# Patient Record
Sex: FEMALE | Race: WHITE | Employment: UNEMPLOYED | URBAN - METROPOLITAN AREA
[De-identification: names, ages, dates, MRNs, and addresses within clinical notes are randomized per-mention and may not be internally consistent; named-entity substitution may affect disease eponyms.]

---

## 2022-06-10 ENCOUNTER — HOSPITAL ENCOUNTER (OUTPATIENT)
Dept: MAMMOGRAPHY | Age: 67
Discharge: HOME | End: 2022-06-12
Payer: COMMERCIAL

## 2022-06-10 VITALS — BODY MASS INDEX: 30.53 KG/M2 | WEIGHT: 190 LBS | HEIGHT: 66 IN

## 2022-06-10 DIAGNOSIS — Z12.31 ENCOUNTER FOR SCREENING MAMMOGRAM FOR MALIGNANT NEOPLASM OF BREAST: ICD-10-CM

## 2022-06-10 PROCEDURE — 77063 BREAST TOMOSYNTHESIS BI: CPT

## 2024-01-29 ENCOUNTER — HOSPITAL ENCOUNTER (EMERGENCY)
Age: 69
Discharge: HOME | End: 2024-01-29
Payer: MEDICARE

## 2024-01-29 ENCOUNTER — APPOINTMENT (OUTPATIENT)
Dept: CT IMAGING | Age: 69
End: 2024-01-29
Payer: MEDICARE

## 2024-01-29 ENCOUNTER — APPOINTMENT (OUTPATIENT)
Dept: GENERAL RADIOLOGY | Age: 69
End: 2024-01-29
Payer: MEDICARE

## 2024-01-29 VITALS
TEMPERATURE: 97.1 F | SYSTOLIC BLOOD PRESSURE: 157 MMHG | HEART RATE: 73 BPM | OXYGEN SATURATION: 96 % | DIASTOLIC BLOOD PRESSURE: 94 MMHG | RESPIRATION RATE: 24 BRPM

## 2024-01-29 DIAGNOSIS — S42.212A CLOSED FRACTURE OF NECK OF LEFT HUMERUS, INITIAL ENCOUNTER: Primary | ICD-10-CM

## 2024-01-29 PROCEDURE — 73200 CT UPPER EXTREMITY W/O DYE: CPT

## 2024-01-29 PROCEDURE — 6370000000 HC RX 637 (ALT 250 FOR IP): Performed by: PHYSICIAN ASSISTANT

## 2024-01-29 PROCEDURE — 73060 X-RAY EXAM OF HUMERUS: CPT

## 2024-01-29 PROCEDURE — 73030 X-RAY EXAM OF SHOULDER: CPT

## 2024-01-29 PROCEDURE — 99284 EMERGENCY DEPT VISIT MOD MDM: CPT

## 2024-01-29 RX ORDER — OXYCODONE HYDROCHLORIDE AND ACETAMINOPHEN 5; 325 MG/1; MG/1
1 TABLET ORAL ONCE
Status: COMPLETED | OUTPATIENT
Start: 2024-01-29 | End: 2024-01-29

## 2024-01-29 RX ORDER — ONDANSETRON 4 MG/1
4 TABLET, ORALLY DISINTEGRATING ORAL ONCE
Status: COMPLETED | OUTPATIENT
Start: 2024-01-29 | End: 2024-01-29

## 2024-01-29 RX ORDER — OXYCODONE HYDROCHLORIDE AND ACETAMINOPHEN 5; 325 MG/1; MG/1
1 TABLET ORAL EVERY 6 HOURS PRN
Qty: 12 TABLET | Refills: 0 | Status: SHIPPED | OUTPATIENT
Start: 2024-01-29 | End: 2024-02-01

## 2024-01-29 RX ORDER — ONDANSETRON 4 MG/1
4 TABLET, ORALLY DISINTEGRATING ORAL 3 TIMES DAILY PRN
Qty: 21 TABLET | Refills: 0 | Status: SHIPPED | OUTPATIENT
Start: 2024-01-29

## 2024-01-29 RX ADMIN — OXYCODONE AND ACETAMINOPHEN 1 TABLET: 5; 325 TABLET ORAL at 22:42

## 2024-01-29 RX ADMIN — ONDANSETRON 4 MG: 4 TABLET, ORALLY DISINTEGRATING ORAL at 22:41

## 2024-01-29 RX ADMIN — OXYCODONE AND ACETAMINOPHEN 1 TABLET: 5; 325 TABLET ORAL at 21:46

## 2024-01-29 ASSESSMENT — PAIN SCALES - GENERAL
PAINLEVEL_OUTOF10: 9
PAINLEVEL_OUTOF10: 9
PAINLEVEL_OUTOF10: 5

## 2024-01-29 ASSESSMENT — LIFESTYLE VARIABLES
HOW MANY STANDARD DRINKS CONTAINING ALCOHOL DO YOU HAVE ON A TYPICAL DAY: 1 OR 2
HOW OFTEN DO YOU HAVE A DRINK CONTAINING ALCOHOL: MONTHLY OR LESS

## 2024-01-29 ASSESSMENT — PAIN - FUNCTIONAL ASSESSMENT: PAIN_FUNCTIONAL_ASSESSMENT: 0-10

## 2024-01-29 ASSESSMENT — PAIN DESCRIPTION - LOCATION: LOCATION: ARM

## 2024-01-29 ASSESSMENT — PAIN DESCRIPTION - ORIENTATION: ORIENTATION: LEFT

## 2024-01-30 NOTE — ED NOTES
Pt given d/c instructions by primary RN. Pt able to verbalize understanding and was ambulatory out of department.

## 2024-01-30 NOTE — ED PROVIDER NOTES
This is a 68-year-old female who presents to the ER after a mechanical fall just prior to arrival, landing on her left shoulder.  9 out of 10 left shoulder pain with decreased range of motion.  Vital signs are stable. PE reveals: + Tenderness palpation at the left shoulder and bicipital groove.  Patient will not flex or extend her shoulder at all because of the pain.  Full range of motion of the elbow, wrist and digits.  No obvious deformity, ecchymosis or edema    Patient given 2 Tabs Percocet 5-3 25 p.o.    XR: Displaced fractures of the humeral neck and greater tuberosity    Ortho consult placed: Spoke with the ortho resident. Recommends CT scan, likely surgical but can be followed up outpatient in the office with Dr. Singletary. Apply shoulder sling.    CT scan performed.    Patient received shoulder immobilizer applied by RN.  Patient discharged home with Percocet for 3 days and Zofran as needed for nausea.  Follow-up with Dr. Singletary and PCP.  She is notified of red flag symptoms and when to return to the ER.  She agreed to treatment plan and had no further questions.  On reevaluation she remained hemodynamically stable, neurovascularly intact and in no acute distress.       Counseling:   The emergency provider has spoken with the patient and discussed today’s results, in addition to providing specific details for the plan of care and counseling regarding the diagnosis and prognosis.  Questions are answered at this time and they are agreeable with the plan.      --------------------------------- IMPRESSION AND DISPOSITION ---------------------------------    IMPRESSION  1. Closed fracture of neck of left humerus, initial encounter        DISPOSITION  Disposition: Discharge to home  Patient condition is good                 Jane Owens PA-C  01/29/24 1218

## 2024-01-30 NOTE — ED NOTES
Previous RNs were not able to get sling on pt. This RN asked BRICE Owens if using a Large sling would be okay and this RN was able to successfully get a sling on the pt

## 2024-01-31 ENCOUNTER — OFFICE VISIT (OUTPATIENT)
Dept: ORTHOPEDIC SURGERY | Age: 69
End: 2024-01-31
Payer: MEDICARE

## 2024-01-31 VITALS — BODY MASS INDEX: 30.53 KG/M2 | WEIGHT: 190 LBS | TEMPERATURE: 98 F | HEIGHT: 66 IN

## 2024-01-31 DIAGNOSIS — S42.212A FX HUMERAL NECK, LEFT, CLOSED, INITIAL ENCOUNTER: Primary | ICD-10-CM

## 2024-01-31 PROCEDURE — 1036F TOBACCO NON-USER: CPT | Performed by: ORTHOPAEDIC SURGERY

## 2024-01-31 PROCEDURE — 3017F COLORECTAL CA SCREEN DOC REV: CPT | Performed by: ORTHOPAEDIC SURGERY

## 2024-01-31 PROCEDURE — G8484 FLU IMMUNIZE NO ADMIN: HCPCS | Performed by: ORTHOPAEDIC SURGERY

## 2024-01-31 PROCEDURE — G8427 DOCREV CUR MEDS BY ELIG CLIN: HCPCS | Performed by: ORTHOPAEDIC SURGERY

## 2024-01-31 PROCEDURE — 99204 OFFICE O/P NEW MOD 45 MIN: CPT | Performed by: ORTHOPAEDIC SURGERY

## 2024-01-31 PROCEDURE — 1123F ACP DISCUSS/DSCN MKR DOCD: CPT | Performed by: ORTHOPAEDIC SURGERY

## 2024-01-31 PROCEDURE — G8419 CALC BMI OUT NRM PARAM NOF/U: HCPCS | Performed by: ORTHOPAEDIC SURGERY

## 2024-01-31 PROCEDURE — 1090F PRES/ABSN URINE INCON ASSESS: CPT | Performed by: ORTHOPAEDIC SURGERY

## 2024-01-31 PROCEDURE — G8400 PT W/DXA NO RESULTS DOC: HCPCS | Performed by: ORTHOPAEDIC SURGERY

## 2024-01-31 NOTE — PROGRESS NOTES
Cathi Kirk is a 68 y.o. female, who presents   Chief Complaint   Patient presents with    Shoulder Pain     Left Shoulder DOI 01/29/2023, had a fall while bowling.  Was seen at [facility] and is currently in a sling.         HPI:: Injury occurred 2 days ago and a fall.  Mrs. Kirk caught her left foot and fell forward and hurt her left shoulder.  It was very painful injury.  She was evaluated in the emergency room including x-rays and CT of the shoulder.  She is placed in a sling and was told that she probably needs surgery.  Her  was present with her throughout the visit and we discussed the injury and treatment options.    Allergies; medications; past medical, surgical, family, and social history; and problem list have been reviewed today and updated as indicated in this encounter - see below following Ortho specifics.    Musculoskeletal: Skin condition gross neurovascular functions good in the left upper extremity.  The arm is in a sling.  Disposition appropriately for to support the arm.  There is no ecchymosis in the shoulder yet.  Told Mrs. Kirk to expect that over time.  The shoulder is quite uncomfortable Forren was not moved around accordingly.    Radiologic Studies: Imaging shows a fracture of the humeral neck which appears to be roughly transverse in the surgical neck area.  There is also a vertical component  the greater tuberosity with slight elevation.  No other bony injuries are noted.    ASSESSMENT:  Cathi was seen today for shoulder pain.    Diagnoses and all orders for this visit:    Fx humeral neck, left, closed, initial encounter     Treatment alternatives were reviewed including medical and physical therapies, injections, and surgical options, expected risks benefits and likely outcome of each were discussed in detail, questions asked and answered and understood.   It seems their understanding of the injury is based primarily on ER personnel's statements   I discussed

## 2024-02-02 ENCOUNTER — TELEPHONE (OUTPATIENT)
Dept: ORTHOPEDIC SURGERY | Age: 69
End: 2024-02-02

## 2024-02-02 ENCOUNTER — PREP FOR PROCEDURE (OUTPATIENT)
Dept: ORTHOPEDIC SURGERY | Age: 69
End: 2024-02-02

## 2024-02-02 PROBLEM — S42.212A CLOSED DISPLACED FRACTURE OF SURGICAL NECK OF LEFT HUMERUS: Status: ACTIVE | Noted: 2024-02-02

## 2024-02-02 NOTE — TELEPHONE ENCOUNTER
Prior Authorization Form:      DEMOGRAPHICS:                     Patient Name:  Cathi Azevedo  Patient :  1955            Insurance:  Payor: MEDICARE / Plan: MEDICARE PART A AND B / Product Type: *No Product type* /   Insurance ID Number:    Payer/Plan Subscr  Sex Relation Sub. Ins. ID Effective Group Num   1. MEDICARE - [place]* CATHI AZEVEDO 1955 Female Self [id number] 24                                    [address] [address]   2. [facility]* CATHI AZEVEDO 1955 Female Self [id number] 24                                    [id number]         DIAGNOSIS & PROCEDURE:                       Procedure/Operation: ORIF - LEFT PROXIMAL HUMERUS           CPT Code: 11963    Diagnosis:  FRACTURE HUMERAL LEFT NECK    ICD10 Code: S42.212A    Location:  [facility]    Surgeon:  SAURABH DILL D.O.    SCHEDULING INFORMATION:                          Date: 2024    Time: TBA              Anesthesia:  General                                                       Status:  Outpatient        Special Comments:  NONE       Electronically signed by Agnieszka Biggs on 2024 at 8:21 AM

## 2024-02-04 ENCOUNTER — ANESTHESIA EVENT (OUTPATIENT)
Dept: OPERATING ROOM | Age: 69
End: 2024-02-04
Payer: MEDICARE

## 2024-02-05 ENCOUNTER — HOSPITAL ENCOUNTER (OUTPATIENT)
Dept: PREADMISSION TESTING | Age: 69
Discharge: HOME | End: 2024-02-05
Payer: MEDICARE

## 2024-02-05 VITALS
DIASTOLIC BLOOD PRESSURE: 80 MMHG | BODY MASS INDEX: 29.73 KG/M2 | SYSTOLIC BLOOD PRESSURE: 145 MMHG | HEIGHT: 66 IN | HEART RATE: 60 BPM | RESPIRATION RATE: 16 BRPM | WEIGHT: 185 LBS | TEMPERATURE: 97 F | OXYGEN SATURATION: 100 %

## 2024-02-05 DIAGNOSIS — Z01.818 PRE-OP TESTING: Primary | ICD-10-CM

## 2024-02-05 LAB
ANION GAP SERPL CALCULATED.3IONS-SCNC: 9 MMOL/L (ref 7–16)
BASOPHILS # BLD: 0.02 K/UL (ref 0–0.2)
BASOPHILS NFR BLD: 0 % (ref 0–2)
BUN SERPL-MCNC: 13 MG/DL (ref 6–23)
CALCIUM SERPL-MCNC: 9 MG/DL (ref 8.6–10.2)
CHLORIDE SERPL-SCNC: 102 MMOL/L (ref 98–107)
CO2 SERPL-SCNC: 28 MMOL/L (ref 22–29)
CREAT SERPL-MCNC: 0.9 MG/DL (ref 0.5–1)
EOSINOPHIL # BLD: 0.09 K/UL (ref 0.05–0.5)
EOSINOPHILS RELATIVE PERCENT: 1 % (ref 0–6)
ERYTHROCYTE [DISTWIDTH] IN BLOOD BY AUTOMATED COUNT: 13.2 % (ref 11.5–15)
GFR SERPL CREATININE-BSD FRML MDRD: >60 ML/MIN/1.73M2
GLUCOSE SERPL-MCNC: 109 MG/DL (ref 74–99)
HCT VFR BLD AUTO: 38.6 % (ref 34–48)
HGB BLD-MCNC: 13.1 G/DL (ref 11.5–15.5)
IMM GRANULOCYTES # BLD AUTO: <0.03 K/UL (ref 0–0.58)
IMM GRANULOCYTES NFR BLD: 0 % (ref 0–5)
LYMPHOCYTES NFR BLD: 1.66 K/UL (ref 1.5–4)
LYMPHOCYTES RELATIVE PERCENT: 20 % (ref 20–42)
MCH RBC QN AUTO: 30.5 PG (ref 26–35)
MCHC RBC AUTO-ENTMCNC: 33.9 G/DL (ref 32–34.5)
MCV RBC AUTO: 90 FL (ref 80–99.9)
MONOCYTES NFR BLD: 0.52 K/UL (ref 0.1–0.95)
MONOCYTES NFR BLD: 6 % (ref 2–12)
NEUTROPHILS NFR BLD: 72 % (ref 43–80)
NEUTS SEG NFR BLD: 5.9 K/UL (ref 1.8–7.3)
PLATELET # BLD AUTO: 272 K/UL (ref 130–450)
PMV BLD AUTO: 9.5 FL (ref 7–12)
POTASSIUM SERPL-SCNC: 4.1 MMOL/L (ref 3.5–5)
RBC # BLD AUTO: 4.29 M/UL (ref 3.5–5.5)
SODIUM SERPL-SCNC: 139 MMOL/L (ref 132–146)
WBC OTHER # BLD: 8.2 K/UL (ref 4.5–11.5)

## 2024-02-05 PROCEDURE — 80048 BASIC METABOLIC PNL TOTAL CA: CPT

## 2024-02-05 PROCEDURE — 93005 ELECTROCARDIOGRAM TRACING: CPT | Performed by: ANESTHESIOLOGY

## 2024-02-05 PROCEDURE — 85025 COMPLETE CBC W/AUTO DIFF WBC: CPT

## 2024-02-05 ASSESSMENT — PAIN DESCRIPTION - DESCRIPTORS: DESCRIPTORS: ACHING

## 2024-02-05 ASSESSMENT — PAIN SCALES - GENERAL: PAINLEVEL_OUTOF10: 3

## 2024-02-05 ASSESSMENT — PAIN DESCRIPTION - ORIENTATION: ORIENTATION: LEFT

## 2024-02-05 ASSESSMENT — PAIN DESCRIPTION - PAIN TYPE: TYPE: ACUTE PAIN

## 2024-02-05 ASSESSMENT — PAIN DESCRIPTION - LOCATION: LOCATION: ARM

## 2024-02-05 NOTE — PROGRESS NOTES
[facility]                                                                                                                    PRE OP INSTRUCTIONS FOR  Cathi Kirk        Date: 2/5/2024    Date of surgery: 2/6/24       Arrival Time: Hospital will call you between 5pm and 7pm with your final arrival time for surgery    Nothing by mouth (NPO) as instructed.______after midnight_____    Take the following medications with a small sip of water on the morning of Surgery: propranolol, meclizine if needed     Diabetics may take evening dose of insulin but none after midnight.  If you feel symptomatic or low blood sugar morning of surgery drink 1-2 ounces of apple juice only.    Aspirin, Ibuprofen, Advil, Naproxen, Vitamin E and other Anti-inflammatory products should be stopped  before surgery  as directed by your physician.  Take Tylenol only unless instructed otherwise by your surgeon.    Check with your Doctor regarding stopping Plavix, Coumadin, Lovenox, Eliquis, Effient, or other blood thinners.    Do not smoke,use illicit drugs and do not drink any alcoholic beverages 24 hours prior to surgery.    You may brush your teeth the morning of surgery.  DO NOT SWALLOW WATER    You MUST make arrangements for a responsible adult to take you home after your surgery. You will not be allowed to leave alone or drive yourself home.  It is strongly suggested someone stay with you the first 24 hrs. Your surgery will be cancelled if you do not have a ride home.    PEDIATRIC PATIENTS ONLY:  A parent/legal guardian must accompany a child scheduled for surgery and plan to stay at the hospital until the child is discharged.  Please do not bring other children with you.    Please wear simple, loose fitting clothing to the hospital.  Do not bring valuables (money, credit cards, checkbooks, etc.) Do not wear any makeup (including no eye makeup) or nail polish on your fingers or toes.    DO NOT wear any jewelry or

## 2024-02-06 ENCOUNTER — ANESTHESIA (OUTPATIENT)
Dept: OPERATING ROOM | Age: 69
End: 2024-02-06
Payer: MEDICARE

## 2024-02-06 ENCOUNTER — HOSPITAL ENCOUNTER (OUTPATIENT)
Age: 69
Setting detail: OUTPATIENT SURGERY
Discharge: HOME | End: 2024-02-06
Attending: ORTHOPAEDIC SURGERY | Admitting: ORTHOPAEDIC SURGERY
Payer: MEDICARE

## 2024-02-06 ENCOUNTER — HOSPITAL ENCOUNTER (OUTPATIENT)
Dept: GENERAL RADIOLOGY | Age: 69
Discharge: HOME | End: 2024-02-08
Attending: ORTHOPAEDIC SURGERY
Payer: MEDICARE

## 2024-02-06 VITALS
SYSTOLIC BLOOD PRESSURE: 128 MMHG | OXYGEN SATURATION: 94 % | DIASTOLIC BLOOD PRESSURE: 68 MMHG | RESPIRATION RATE: 16 BRPM | TEMPERATURE: 97 F | HEART RATE: 60 BPM

## 2024-02-06 DIAGNOSIS — S42.212A CLOSED DISPLACED FRACTURE OF SURGICAL NECK OF LEFT HUMERUS, UNSPECIFIED FRACTURE MORPHOLOGY, INITIAL ENCOUNTER: ICD-10-CM

## 2024-02-06 DIAGNOSIS — S42.212A CLOSED DISPLACED FRACTURE OF SURGICAL NECK OF LEFT HUMERUS, UNSPECIFIED FRACTURE MORPHOLOGY, INITIAL ENCOUNTER: Primary | ICD-10-CM

## 2024-02-06 PROCEDURE — 7100000010 HC PHASE II RECOVERY - FIRST 15 MIN: Performed by: ORTHOPAEDIC SURGERY

## 2024-02-06 PROCEDURE — 3600000004 HC SURGERY LEVEL 4 BASE: Performed by: ORTHOPAEDIC SURGERY

## 2024-02-06 PROCEDURE — 6370000000 HC RX 637 (ALT 250 FOR IP)

## 2024-02-06 PROCEDURE — C1713 ANCHOR/SCREW BN/BN,TIS/BN: HCPCS | Performed by: ORTHOPAEDIC SURGERY

## 2024-02-06 PROCEDURE — 6360000002 HC RX W HCPCS

## 2024-02-06 PROCEDURE — 7100000001 HC PACU RECOVERY - ADDTL 15 MIN: Performed by: ORTHOPAEDIC SURGERY

## 2024-02-06 PROCEDURE — 2580000003 HC RX 258: Performed by: NURSE ANESTHETIST, CERTIFIED REGISTERED

## 2024-02-06 PROCEDURE — 3600000014 HC SURGERY LEVEL 4 ADDTL 15MIN: Performed by: ORTHOPAEDIC SURGERY

## 2024-02-06 PROCEDURE — 3700000000 HC ANESTHESIA ATTENDED CARE: Performed by: ORTHOPAEDIC SURGERY

## 2024-02-06 PROCEDURE — 7100000011 HC PHASE II RECOVERY - ADDTL 15 MIN: Performed by: ORTHOPAEDIC SURGERY

## 2024-02-06 PROCEDURE — 6360000002 HC RX W HCPCS: Performed by: ORTHOPAEDIC SURGERY

## 2024-02-06 PROCEDURE — 2709999900 HC NON-CHARGEABLE SUPPLY: Performed by: ORTHOPAEDIC SURGERY

## 2024-02-06 PROCEDURE — 2580000003 HC RX 258: Performed by: ORTHOPAEDIC SURGERY

## 2024-02-06 PROCEDURE — 23615 OPTX PROX HUMRL FX W/INT FIX: CPT | Performed by: ORTHOPAEDIC SURGERY

## 2024-02-06 PROCEDURE — 2720000010 HC SURG SUPPLY STERILE: Performed by: ORTHOPAEDIC SURGERY

## 2024-02-06 PROCEDURE — 6360000002 HC RX W HCPCS: Performed by: NURSE ANESTHETIST, CERTIFIED REGISTERED

## 2024-02-06 PROCEDURE — 7100000000 HC PACU RECOVERY - FIRST 15 MIN: Performed by: ORTHOPAEDIC SURGERY

## 2024-02-06 PROCEDURE — 6360000002 HC RX W HCPCS: Performed by: ANESTHESIOLOGY

## 2024-02-06 PROCEDURE — 2580000003 HC RX 258: Performed by: ANESTHESIOLOGY

## 2024-02-06 PROCEDURE — 3700000001 HC ADD 15 MINUTES (ANESTHESIA): Performed by: ORTHOPAEDIC SURGERY

## 2024-02-06 PROCEDURE — 64415 NJX AA&/STRD BRCH PLXS IMG: CPT | Performed by: ANESTHESIOLOGY

## 2024-02-06 DEVICE — IMPLANTABLE DEVICE: Type: IMPLANTABLE DEVICE | Site: HUMERUS | Status: FUNCTIONAL

## 2024-02-06 RX ORDER — MIDAZOLAM HYDROCHLORIDE 1 MG/ML
2 INJECTION INTRAMUSCULAR; INTRAVENOUS ONCE
Status: DISCONTINUED | OUTPATIENT
Start: 2024-02-06 | End: 2024-02-06 | Stop reason: HOSPADM

## 2024-02-06 RX ORDER — ONDANSETRON 2 MG/ML
4 INJECTION INTRAMUSCULAR; INTRAVENOUS
Status: DISCONTINUED | OUTPATIENT
Start: 2024-02-06 | End: 2024-02-06 | Stop reason: HOSPADM

## 2024-02-06 RX ORDER — LIDOCAINE HYDROCHLORIDE 20 MG/ML
INJECTION, SOLUTION INTRAVENOUS PRN
Status: DISCONTINUED | OUTPATIENT
Start: 2024-02-06 | End: 2024-02-06 | Stop reason: SDUPTHER

## 2024-02-06 RX ORDER — FENTANYL CITRATE 0.05 MG/ML
25 INJECTION, SOLUTION INTRAMUSCULAR; INTRAVENOUS EVERY 5 MIN PRN
Status: DISCONTINUED | OUTPATIENT
Start: 2024-02-06 | End: 2024-02-06 | Stop reason: HOSPADM

## 2024-02-06 RX ORDER — FENTANYL CITRATE 50 UG/ML
INJECTION, SOLUTION INTRAMUSCULAR; INTRAVENOUS
Status: DISCONTINUED
Start: 2024-02-06 | End: 2024-02-06 | Stop reason: HOSPADM

## 2024-02-06 RX ORDER — SODIUM CHLORIDE, SODIUM LACTATE, POTASSIUM CHLORIDE, CALCIUM CHLORIDE 600; 310; 30; 20 MG/100ML; MG/100ML; MG/100ML; MG/100ML
INJECTION, SOLUTION INTRAVENOUS CONTINUOUS PRN
Status: DISCONTINUED | OUTPATIENT
Start: 2024-02-06 | End: 2024-02-06 | Stop reason: SDUPTHER

## 2024-02-06 RX ORDER — MIDAZOLAM HYDROCHLORIDE 1 MG/ML
2 INJECTION INTRAMUSCULAR; INTRAVENOUS ONCE
Status: COMPLETED | OUTPATIENT
Start: 2024-02-06 | End: 2024-02-06

## 2024-02-06 RX ORDER — PROPOFOL 10 MG/ML
INJECTION, EMULSION INTRAVENOUS PRN
Status: DISCONTINUED | OUTPATIENT
Start: 2024-02-06 | End: 2024-02-06 | Stop reason: SDUPTHER

## 2024-02-06 RX ORDER — ROPIVACAINE HYDROCHLORIDE 5 MG/ML
INJECTION, SOLUTION EPIDURAL; INFILTRATION; PERINEURAL
Status: COMPLETED | OUTPATIENT
Start: 2024-02-06 | End: 2024-02-06

## 2024-02-06 RX ORDER — MIDAZOLAM HYDROCHLORIDE 1 MG/ML
INJECTION INTRAMUSCULAR; INTRAVENOUS
Status: COMPLETED
Start: 2024-02-06 | End: 2024-02-06

## 2024-02-06 RX ORDER — VANCOMYCIN HYDROCHLORIDE 1 G/20ML
INJECTION, POWDER, LYOPHILIZED, FOR SOLUTION INTRAVENOUS PRN
Status: DISCONTINUED | OUTPATIENT
Start: 2024-02-06 | End: 2024-02-06 | Stop reason: ALTCHOICE

## 2024-02-06 RX ORDER — DEXAMETHASONE SODIUM PHOSPHATE 10 MG/ML
INJECTION, SOLUTION INTRAMUSCULAR; INTRAVENOUS PRN
Status: DISCONTINUED | OUTPATIENT
Start: 2024-02-06 | End: 2024-02-06 | Stop reason: SDUPTHER

## 2024-02-06 RX ORDER — GLYCOPYRROLATE 0.2 MG/ML
INJECTION INTRAMUSCULAR; INTRAVENOUS PRN
Status: DISCONTINUED | OUTPATIENT
Start: 2024-02-06 | End: 2024-02-06 | Stop reason: SDUPTHER

## 2024-02-06 RX ORDER — FENTANYL CITRATE 50 UG/ML
100 INJECTION, SOLUTION INTRAMUSCULAR; INTRAVENOUS ONCE
Status: COMPLETED | OUTPATIENT
Start: 2024-02-06 | End: 2024-02-06

## 2024-02-06 RX ORDER — NEOSTIGMINE METHYLSULFATE 1 MG/ML
INJECTION, SOLUTION INTRAVENOUS PRN
Status: DISCONTINUED | OUTPATIENT
Start: 2024-02-06 | End: 2024-02-06 | Stop reason: SDUPTHER

## 2024-02-06 RX ORDER — SODIUM CHLORIDE, SODIUM LACTATE, POTASSIUM CHLORIDE, CALCIUM CHLORIDE 600; 310; 30; 20 MG/100ML; MG/100ML; MG/100ML; MG/100ML
INJECTION, SOLUTION INTRAVENOUS CONTINUOUS
Status: DISCONTINUED | OUTPATIENT
Start: 2024-02-06 | End: 2024-02-06 | Stop reason: HOSPADM

## 2024-02-06 RX ORDER — FENTANYL CITRATE 50 UG/ML
100 INJECTION, SOLUTION INTRAMUSCULAR; INTRAVENOUS ONCE
Status: DISCONTINUED | OUTPATIENT
Start: 2024-02-06 | End: 2024-02-06 | Stop reason: HOSPADM

## 2024-02-06 RX ORDER — SODIUM CHLORIDE 0.9 % (FLUSH) 0.9 %
5-40 SYRINGE (ML) INJECTION EVERY 12 HOURS SCHEDULED
Status: DISCONTINUED | OUTPATIENT
Start: 2024-02-06 | End: 2024-02-06 | Stop reason: HOSPADM

## 2024-02-06 RX ORDER — OXYCODONE HYDROCHLORIDE AND ACETAMINOPHEN 5; 325 MG/1; MG/1
1 TABLET ORAL EVERY 6 HOURS PRN
Qty: 28 TABLET | Refills: 0 | Status: SHIPPED | OUTPATIENT
Start: 2024-02-06 | End: 2024-02-21

## 2024-02-06 RX ORDER — FENTANYL CITRATE 50 UG/ML
INJECTION, SOLUTION INTRAMUSCULAR; INTRAVENOUS PRN
Status: DISCONTINUED | OUTPATIENT
Start: 2024-02-06 | End: 2024-02-06 | Stop reason: SDUPTHER

## 2024-02-06 RX ORDER — FENTANYL CITRATE 50 UG/ML
INJECTION, SOLUTION INTRAMUSCULAR; INTRAVENOUS
Status: DISCONTINUED
Start: 2024-02-06 | End: 2024-02-06 | Stop reason: WASHOUT

## 2024-02-06 RX ORDER — SCOLOPAMINE TRANSDERMAL SYSTEM 1 MG/1
PATCH, EXTENDED RELEASE TRANSDERMAL
Status: COMPLETED
Start: 2024-02-06 | End: 2024-02-06

## 2024-02-06 RX ORDER — DIPHENHYDRAMINE HYDROCHLORIDE 50 MG/ML
INJECTION INTRAMUSCULAR; INTRAVENOUS PRN
Status: DISCONTINUED | OUTPATIENT
Start: 2024-02-06 | End: 2024-02-06 | Stop reason: SDUPTHER

## 2024-02-06 RX ORDER — SODIUM CHLORIDE 9 MG/ML
INJECTION, SOLUTION INTRAVENOUS PRN
Status: DISCONTINUED | OUTPATIENT
Start: 2024-02-06 | End: 2024-02-06 | Stop reason: HOSPADM

## 2024-02-06 RX ORDER — ONDANSETRON 2 MG/ML
INJECTION INTRAMUSCULAR; INTRAVENOUS PRN
Status: DISCONTINUED | OUTPATIENT
Start: 2024-02-06 | End: 2024-02-06 | Stop reason: SDUPTHER

## 2024-02-06 RX ORDER — ROPIVACAINE HYDROCHLORIDE 5 MG/ML
INJECTION, SOLUTION EPIDURAL; INFILTRATION; PERINEURAL
Status: COMPLETED
Start: 2024-02-06 | End: 2024-02-06

## 2024-02-06 RX ORDER — ROPIVACAINE HYDROCHLORIDE 5 MG/ML
30 INJECTION, SOLUTION EPIDURAL; INFILTRATION; PERINEURAL ONCE
Status: DISCONTINUED | OUTPATIENT
Start: 2024-02-06 | End: 2024-02-06 | Stop reason: HOSPADM

## 2024-02-06 RX ORDER — SCOLOPAMINE TRANSDERMAL SYSTEM 1 MG/1
1 PATCH, EXTENDED RELEASE TRANSDERMAL
Status: DISCONTINUED | OUTPATIENT
Start: 2024-02-06 | End: 2024-02-06 | Stop reason: HOSPADM

## 2024-02-06 RX ORDER — SODIUM CHLORIDE 0.9 % (FLUSH) 0.9 %
5-40 SYRINGE (ML) INJECTION PRN
Status: DISCONTINUED | OUTPATIENT
Start: 2024-02-06 | End: 2024-02-06 | Stop reason: HOSPADM

## 2024-02-06 RX ORDER — MIDAZOLAM HYDROCHLORIDE 1 MG/ML
2 INJECTION INTRAMUSCULAR; INTRAVENOUS
Status: DISCONTINUED | OUTPATIENT
Start: 2024-02-06 | End: 2024-02-06 | Stop reason: HOSPADM

## 2024-02-06 RX ADMIN — PHENYLEPHRINE HYDROCHLORIDE 100 MCG: 10 INJECTION INTRAVENOUS at 13:47

## 2024-02-06 RX ADMIN — LIDOCAINE HYDROCHLORIDE 100 MG: 20 INJECTION, SOLUTION INTRAVENOUS at 11:50

## 2024-02-06 RX ADMIN — PROPOFOL 10 MG: 10 INJECTION, EMULSION INTRAVENOUS at 11:45

## 2024-02-06 RX ADMIN — ONDANSETRON 4 MG: 2 INJECTION INTRAMUSCULAR; INTRAVENOUS at 11:46

## 2024-02-06 RX ADMIN — GLYCOPYRROLATE 0.2 MG: 0.2 INJECTION INTRAMUSCULAR; INTRAVENOUS at 11:56

## 2024-02-06 RX ADMIN — CEFAZOLIN 2000 MG: 2 INJECTION, POWDER, FOR SOLUTION INTRAMUSCULAR; INTRAVENOUS at 12:00

## 2024-02-06 RX ADMIN — MIDAZOLAM 2 MG: 1 INJECTION INTRAMUSCULAR; INTRAVENOUS at 11:04

## 2024-02-06 RX ADMIN — DEXAMETHASONE SODIUM PHOSPHATE 10 MG: 10 INJECTION INTRAMUSCULAR; INTRAVENOUS at 12:06

## 2024-02-06 RX ADMIN — Medication 3 MG: at 13:54

## 2024-02-06 RX ADMIN — DIPHENHYDRAMINE HYDROCHLORIDE 12.5 MG: 50 INJECTION INTRAMUSCULAR; INTRAVENOUS at 11:46

## 2024-02-06 RX ADMIN — FENTANYL CITRATE 100 MCG: 50 INJECTION, SOLUTION INTRAMUSCULAR; INTRAVENOUS at 11:04

## 2024-02-06 RX ADMIN — GLYCOPYRROLATE 0.6 MG: 0.2 INJECTION INTRAMUSCULAR; INTRAVENOUS at 13:54

## 2024-02-06 RX ADMIN — SODIUM CHLORIDE, POTASSIUM CHLORIDE, SODIUM LACTATE AND CALCIUM CHLORIDE: 600; 310; 30; 20 INJECTION, SOLUTION INTRAVENOUS at 09:46

## 2024-02-06 RX ADMIN — ROPIVACAINE HYDROCHLORIDE 20 ML: 5 INJECTION, SOLUTION EPIDURAL; INFILTRATION; PERINEURAL at 11:14

## 2024-02-06 RX ADMIN — FENTANYL CITRATE 100 MCG: 50 INJECTION INTRAMUSCULAR; INTRAVENOUS at 11:04

## 2024-02-06 RX ADMIN — PHENYLEPHRINE HYDROCHLORIDE 100 MCG: 10 INJECTION INTRAVENOUS at 13:34

## 2024-02-06 RX ADMIN — MIDAZOLAM HYDROCHLORIDE 2 MG: 1 INJECTION INTRAMUSCULAR; INTRAVENOUS at 11:04

## 2024-02-06 RX ADMIN — FENTANYL CITRATE 50 MCG: 50 INJECTION, SOLUTION INTRAMUSCULAR; INTRAVENOUS at 11:50

## 2024-02-06 RX ADMIN — PROPOFOL 160 MG: 10 INJECTION, EMULSION INTRAVENOUS at 11:50

## 2024-02-06 RX ADMIN — FENTANYL CITRATE 50 MCG: 50 INJECTION, SOLUTION INTRAMUSCULAR; INTRAVENOUS at 12:14

## 2024-02-06 RX ADMIN — SODIUM CHLORIDE, POTASSIUM CHLORIDE, SODIUM LACTATE AND CALCIUM CHLORIDE: 600; 310; 30; 20 INJECTION, SOLUTION INTRAVENOUS at 11:35

## 2024-02-06 ASSESSMENT — PAIN - FUNCTIONAL ASSESSMENT
PAIN_FUNCTIONAL_ASSESSMENT: NONE - DENIES PAIN
PAIN_FUNCTIONAL_ASSESSMENT: 0-10

## 2024-02-06 ASSESSMENT — PAIN DESCRIPTION - DESCRIPTORS: DESCRIPTORS: ACHING

## 2024-02-06 NOTE — H&P
no crackles or wheezing  Heart:  Normal apical impulse, regular rate and rhythm, normal S1 and S2, no S3 or S4, and no murmur noted  Abdomen:  normal bowel sounds  Skin: warm and dry, no rash or erythema  ENT: tympanic membrane, external ear and ear canal normal bilaterally, oropharynx clear and moist with normal mucous membranes  Musculoskeletal: Decreased range of motion, with joint swelling, deformity and tenderness , left shoulder    General Labs:  CBC:   Lab Results   Component Value Date/Time    WBC 8.2 02/05/2024 10:38 AM    RBC 4.29 02/05/2024 10:38 AM    HGB 13.1 02/05/2024 10:38 AM    HCT 38.6 02/05/2024 10:38 AM    MCV 90.0 02/05/2024 10:38 AM    RDW 13.2 02/05/2024 10:38 AM     02/05/2024 10:38 AM     CMP:    Lab Results   Component Value Date/Time     02/05/2024 10:38 AM    K 4.1 02/05/2024 10:38 AM     02/05/2024 10:38 AM    CO2 28 02/05/2024 10:38 AM    BUN 13 02/05/2024 10:38 AM     U/A:  No components found for: \"UCOLOR\", \"UCLARITY\", \"USPGRAV\", \"UPH\", \"UPROTEIN\", \"UGLUCOSE\", \"UKETONE\", \"UBILI\", \"UBLOOD\", \"UNITRITE\", \"UUROBIL\", \"ULEUKEST\", \"USQEPI\", \"URENEPI\", \"UWBC\", \"URBC\", \"UBACTERIA\", \"UHYALINE\"    Radiology: Comminuted fracture left proximal humerus with surgical neck and greater tuberosity fractures    ASSESSMENT AND PLAN:    ORIF left proximal humerus      Electronically signed by WILFREDO Chavira DO on 2/6/2024 at 8:29 AM

## 2024-02-06 NOTE — ANESTHESIA PROCEDURE NOTES
Peripheral Block    Patient location during procedure: pre-op  Reason for block: post-op pain management and at surgeon's request  Start time: 2/6/2024 11:14 AM  End time: 2/6/2024 11:19 AM  Staffing  Performed: resident/CRNA   Anesthesiologist: James Schuler MD  Resident/CRNA: Jaguar Garcia APRN - CRNA  Performed by: Jaguar Garcia APRN - CRNA  Authorized by: James Schuler MD    Preanesthetic Checklist  Completed: patient identified, IV checked, site marked, risks and benefits discussed, surgical/procedural consents, equipment checked, pre-op evaluation, timeout performed, anesthesia consent given, oxygen available, monitors applied/VS acknowledged, fire risk safety assessment completed and verbalized and blood product R/B/A discussed and consented  Peripheral Block   Patient position: supine  Prep: ChloraPrep  Provider prep: mask and sterile gloves  Patient monitoring: cardiac monitor, continuous pulse ox, frequent blood pressure checks, IV access, oxygen and responsive to questions  Block type: Brachial plexus  Interscalene  Laterality: left  Injection technique: single-shot  Guidance: ultrasound guided    Needle   Needle type: insulated echogenic nerve stimulator needle   Needle gauge: 22 G  Needle localization: ultrasound guidance  Needle length: 5 cm  Assessment   Injection assessment: negative aspiration for heme, no paresthesia on injection and local visualized surrounding nerve on ultrasound  Paresthesia pain: none  Slow fractionated injection: yes  Hemodynamics: stable  Real-time US image taken/store: yes  Outcomes: patient tolerated procedure well and uncomplicated    Additional Notes  Timeout performed          Medications Administered  ropivacaine (NAROPIN) injection 0.5% - Perineural   20 mL - 2/6/2024 11:14:00 AM

## 2024-02-06 NOTE — ANESTHESIA POSTPROCEDURE EVALUATION
Department of Anesthesiology  Postprocedure Note    Patient: Cathi Kirk  MRN: [id number]  YOB: 1955  Date of evaluation: 2/6/2024    Procedure Summary       Date: 02/06/24 Room / Location: [address]    Anesthesia Start: 1135 Anesthesia Stop: 1433    Procedure: HUMERUS OPEN REDUCTION INTERNAL FIXATION (Left) Diagnosis:       Closed displaced fracture of surgical neck of left humerus      (Closed displaced fracture of surgical neck of left humerus [S42.212A])    Surgeons: WILFREDO Chavira DO Responsible Provider: James Schuler MD    Anesthesia Type: general, regional ASA Status: 2            Anesthesia Type: No value filed.    Yoni Phase I: Yoni Score: 9    Yoni Phase II: Yoni Score: 10    Anesthesia Post Evaluation    Patient location during evaluation: PACU  Patient participation: complete - patient participated  Level of consciousness: lethargic and awake  Pain score: 2  Airway patency: patent  Nausea & Vomiting: no nausea  Cardiovascular status: blood pressure returned to baseline  Respiratory status: acceptable  Hydration status: euvolemic  Pain management: adequate    No notable events documented.

## 2024-02-06 NOTE — ANESTHESIA PRE PROCEDURE
Department of Anesthesiology  Preprocedure Note       Name:  Cathi Kirk   Age:  68 y.o.  :  1955                                          MRN:  [id number]         Date:  2024      Surgeon: Surgeon(s):  WILFREDO Chavira DO    Procedure: Procedure(s):  HUMERUS OPEN REDUCTION INTERNAL FIXATION    Medications prior to admission:   Prior to Admission medications    Medication Sig Start Date End Date Taking? Authorizing Provider   ondansetron (ZOFRAN-ODT) 4 MG disintegrating tablet Take 1 tablet by mouth 3 times daily as needed for Nausea or Vomiting 24   Jane Owens PA-C   meclizine (ANTIVERT) 25 MG tablet Take 1-2 tablets TID prn vertigo 14   David Champion MD   econazole nitrate 1 % cream Apply topically daily. 14   John Ahmadi MD   propranolol (INDERAL) 20 MG tablet Take 1 tablet by mouth 3 times daily    ProviderAnita MD   solifenacin (VESICARE) 10 MG tablet Take 0.5 tablets by mouth daily    Provider, MD Anita       Current medications:    Current Facility-Administered Medications   Medication Dose Route Frequency Provider Last Rate Last Admin    ceFAZolin (ANCEF) 2,000 mg in sterile water 20 mL IV syringe  2,000 mg IntraVENous On Call to OR WILFREDO Chavira DO        lactated ringers IV soln infusion   IntraVENous Continuous Jameson Null MD 50 mL/hr at 24 0946 New Bag at 24 0946    fentaNYL (SUBLIMAZE) 100 MCG/2ML injection             ROPivacaine (NAROPIN) 0.5% injection             midazolam (VERSED) 2 MG/2ML injection                Allergies:  No Known Allergies    Problem List:    Patient Active Problem List   Diagnosis Code    Closed displaced fracture of surgical neck of left humerus S42.212A       Past Medical History:        Diagnosis Date    PONV (postoperative nausea and vomiting)     Prolonged emergence from general anesthesia        Past Surgical History:        Procedure Laterality Date    APPENDECTOMY

## 2024-02-06 NOTE — PROGRESS NOTES
1100 the patient was brought to PACU and placed on the cardiac monitor an oxygen via nasal cannula.   1101 Timeout completed for a left interscalene nerve block.   1104 The patient was premedicated as seen the in the MAR.  [id number] Left sided interscalene nerve block completed by Jaguar Garcia CRNA with Dr. Schuler at the bedside. The ultrasound was utilized. The nerve stimulator was utilized. Twitch seen at 0.8 and then reduced to 0.28 and then discontinued. The patient tolerated the procedure very well. No complications noted. Vital signs recorded in the flowsheets.

## 2024-02-06 NOTE — OP NOTE
Operative Note      Patient: Cathi Kirk  YOB: 1955  MRN: [id number]    Date of Procedure: 2/6/2024    Pre-Op Diagnosis Codes:     * Closed displaced fracture of surgical neck of left humerus [S42.212A]    Post-Op Diagnosis:  Same with displaced fracture greater tuberosity left proximal humerus       Procedure(s):  HUMERUS OPEN REDUCTION INTERNAL FIXATION    Surgeon(s):  WILFREDO Chavira DO    Assistant:   Resident: Ryan Aiken DO; Ben Thakur DO    Anesthesia: General    Estimated Blood Loss (mL): 200     Complications: None    Specimens:   * No specimens in log *    Implants:  Implant Name Type Inv. Item Serial No.  Lot No. LRB No. Used Action   PIN FIX 1.6X150 MM ORTHOLOC SPS - [id number]  PIN FIX 1.6X150 MM ORTHOLOC SPS  [facility]  Left 1 Implanted   ORTHOLOC SPS 3.5X22MM CORTICAL NON-LOCKING SCREW - [id number]  ORTHOLOC SPS 3.5X22MM CORTICAL NON-LOCKING SCREW  [facility]  Left 1 Implanted   ORTHOLOC SPS 90MM STANDARD3 HOLE PLATE - [id number]  ORTHOLOC SPS 90MM STANDARD3 HOLE PLATE  [employer]  Left 1 Implanted   ORTHOLOC SPS 3.5X42MM BLUNTTIPPED LOCKING SCREW - [id number]  ORTHOLOC SPS 3.5X42MM BLUNTTIPPED LOCKING SCREW  [employer]  Left 1 Implanted   ORTHOLOC SPS 3.5X38MM BLUNTTIPPED LOCKING SCREW - [id number]  ORTHOLOC SPS 3.5X38MM BLUNTTIPPED LOCKING SCREW  [facility]  Left 5 Implanted   ORTHOLOC SPS 3.5X42MM BLUNTTIPPED LOCKING SCREW - [id number]  ORTHOLOC SPS 3.5X42MM BLUNTTIPPED LOCKING SCREW  [employer]  Left 5 Implanted   ORTHOLOC SPS 3.5X34MM BLUNTTIPPED LOCKING SCREW - [id number]  ORTHOLOC SPS 3.5X34MM BLUNTTIPPED LOCKING SCREW  [employer]  Left 1 Implanted   ORTHOLOC SPS 3.5X24MM CORTICAL NON-LOCKING SCREW - [id number]  ORTHOLOC SPS 3.5X24MM CORTICAL NON-LOCKING SCREW  [employer]  Left

## 2024-02-07 LAB
EKG ATRIAL RATE: 56 BPM
EKG P AXIS: 40 DEGREES
EKG P-R INTERVAL: 118 MS
EKG Q-T INTERVAL: 476 MS
EKG QRS DURATION: 98 MS
EKG QTC CALCULATION (BAZETT): 459 MS
EKG R AXIS: -6 DEGREES
EKG T AXIS: 176 DEGREES
EKG VENTRICULAR RATE: 56 BPM

## 2024-02-21 ENCOUNTER — OFFICE VISIT (OUTPATIENT)
Dept: ORTHOPEDIC SURGERY | Age: 69
End: 2024-02-21

## 2024-02-21 VITALS — TEMPERATURE: 98 F | WEIGHT: 185 LBS | BODY MASS INDEX: 29.73 KG/M2 | HEIGHT: 66 IN

## 2024-02-21 DIAGNOSIS — S42.212A FX HUMERAL NECK, LEFT, CLOSED, INITIAL ENCOUNTER: Primary | ICD-10-CM

## 2024-02-21 PROCEDURE — 99024 POSTOP FOLLOW-UP VISIT: CPT | Performed by: ORTHOPAEDIC SURGERY

## 2024-02-21 NOTE — PROGRESS NOTES
and oriented to person, place, and time.   Skin: Skin is warm and dry.   Psychiatric: Normal mood and affect. Behavior is normal. Thought content normal.    WILFREDO Chavira DO    2/21/24  11:22 AM

## 2024-03-13 ENCOUNTER — OFFICE VISIT (OUTPATIENT)
Dept: ORTHOPEDIC SURGERY | Age: 69
End: 2024-03-13

## 2024-03-13 VITALS — HEIGHT: 66 IN | WEIGHT: 180 LBS | BODY MASS INDEX: 28.93 KG/M2 | TEMPERATURE: 98 F

## 2024-03-13 DIAGNOSIS — S42.212A FX HUMERAL NECK, LEFT, CLOSED, INITIAL ENCOUNTER: Primary | ICD-10-CM

## 2024-03-13 PROCEDURE — 99024 POSTOP FOLLOW-UP VISIT: CPT | Performed by: ORTHOPAEDIC SURGERY

## 2024-03-13 RX ORDER — PROPRANOLOL HCL 60 MG
60 CAPSULE, EXTENDED RELEASE 24HR ORAL DAILY
COMMUNITY
Start: 2024-01-09

## 2024-03-13 NOTE — PROGRESS NOTES
Chief Complaint:   Chief Complaint   Patient presents with    Shoulder Pain     Left shoulder ORIF f/u doing well just aches when laying down.        Cathi Kirk follows up 5 weeks postop ORIF left proximal humerus.  She is antsy and wants to do more with her arm.  She has been doing some pendulum and elbow exercises.  She like to do little housework with the right upper extremity.  She has about driving but I think this would be a bad idea one-handed.      Allergies; medications; past medical, surgical, family, and social history; and problem list have been reviewed today and updated as indicated in this encounter seen below.    Exam: The incision is healing well.  Range of motion of the left shoulder passively is about 30 degrees extension and about 80 degrees of forward elevation in the sagittal plane.  Abduction is about 70 degrees.  Her rotation is quite limited but she is pliable at the endpoints.    Radiographs: None    ASSESSMENT:    Cathi was seen today for shoulder pain.    Diagnoses and all orders for this visit:    Fx humeral neck, left, closed, initial encounter        PLAN: We will schedule physical therapy to start next week.  This will start to work on her range of motion.  Strengthening will come along later.  Will x-ray also.    Return for L shoulder X  IR / ER.       Current Outpatient Medications   Medication Sig Dispense Refill    propranolol (INDERAL LA) 60 MG extended release capsule Take 1 capsule by mouth daily       No current facility-administered medications for this visit.       Patient Active Problem List   Diagnosis    Closed displaced fracture of surgical neck of left humerus       Past Medical History:   Diagnosis Date    PONV (postoperative nausea and vomiting)     Prolonged emergence from general anesthesia        Past Surgical History:   Procedure Laterality Date    APPENDECTOMY       SECTION      COLONOSCOPY      HUMERUS FRACTURE SURGERY Left 2024    HUMERUS OPEN

## 2024-04-03 ENCOUNTER — OFFICE VISIT (OUTPATIENT)
Dept: ORTHOPEDIC SURGERY | Age: 69
End: 2024-04-03

## 2024-04-03 VITALS — BODY MASS INDEX: 28.93 KG/M2 | TEMPERATURE: 98 F | WEIGHT: 180 LBS | HEIGHT: 66 IN

## 2024-04-03 DIAGNOSIS — S42.212A FX HUMERAL NECK, LEFT, CLOSED, INITIAL ENCOUNTER: Primary | ICD-10-CM

## 2024-04-03 PROCEDURE — 99024 POSTOP FOLLOW-UP VISIT: CPT | Performed by: ORTHOPAEDIC SURGERY

## 2024-04-03 NOTE — PROGRESS NOTES
Chief Complaint:   Chief Complaint   Patient presents with    Shoulder Pain     Left shoulder ORIF f/u doing better.       Cathi Kirk follows up about 8 weeks postop ORIF left proximal humerus.  She is doing pretty well.  She is in physical therapy.  She still has significant limitations of motion.  She has been wearing her sling to date.  She says that the therapist said that there was no indication of what tendon was fixed.  The operative report was reviewed and is clearly delineated the supraspinatus was stabilized.      Allergies; medications; past medical, surgical, family, and social history; and problem list have been reviewed today and updated as indicated in this encounter seen below.    Exam: Range of motion left shoulder is limited in all planes.  She is pliable and ranges with some discomfort and was not stressed inordinately.    Radiographs: Imaging of the left proximal humerus and 2 views shows maintenance of fixation with no suggestion of loosening.  Fracture lines are not visible suggesting progressive healing.    ASSESSMENT:    Cathi was seen today for shoulder pain.    Diagnoses and all orders for this visit:    Fx humeral neck, left, closed, initial encounter  -     XR SHOULDER LEFT (MIN 2 VIEWS); Future        PLAN: Continue with physical therapy.  She may discontinue the sling and gradually increase activities.  She was told to avoid heavy lifting and heavy tasks for a time.    Return in about 4 weeks (around 5/1/2024).       Current Outpatient Medications   Medication Sig Dispense Refill    propranolol (INDERAL LA) 60 MG extended release capsule Take 1 capsule by mouth daily       No current facility-administered medications for this visit.       Patient Active Problem List   Diagnosis    Closed displaced fracture of surgical neck of left humerus       Past Medical History:   Diagnosis Date    PONV (postoperative nausea and vomiting)     Prolonged emergence from general anesthesia

## 2024-05-01 ENCOUNTER — OFFICE VISIT (OUTPATIENT)
Dept: ORTHOPEDIC SURGERY | Age: 69
End: 2024-05-01

## 2024-05-01 VITALS — HEIGHT: 66 IN | BODY MASS INDEX: 28.93 KG/M2 | TEMPERATURE: 98 F | WEIGHT: 180 LBS

## 2024-05-01 DIAGNOSIS — S42.212A FX HUMERAL NECK, LEFT, CLOSED, INITIAL ENCOUNTER: Primary | ICD-10-CM

## 2024-05-01 PROCEDURE — 99024 POSTOP FOLLOW-UP VISIT: CPT | Performed by: ORTHOPAEDIC SURGERY

## 2024-05-01 NOTE — PROGRESS NOTES
Chief Complaint:   Chief Complaint   Patient presents with    Shoulder Pain     Left Shoulder ORIF DOS 2024, still in PT       Cathi DAVISON Yelena follows about 12 weeks postop ORIF left proximal humerus.  She still has some stiffness and some soreness.  She is improving.  She is going to therapy in [place].  She says that measurements show that she is progressively improving but at a slow pace.      Allergies; medications; past medical, surgical, family, and social history; and problem list have been reviewed today and updated as indicated in this encounter seen below.    Exam: Flexion actively of the left left shoulder in the sagittal plane is about 90 degrees.  Abduction is less than that.  She still has limitations of internal rotation and is able to get to the lateral buttock area.  She is also able to get to the left ear with external rotation.  Cross body adduction is limited as well.  Elbow and forearm function are good    Radiographs: Imaging was reviewed with the patient and her .    ASSESSMENT:    Cathi was seen today for shoulder pain.    Diagnoses and all orders for this visit:    Fx humeral neck, left, closed, initial encounter        PLAN: Continue with her outpatient physical therapy and home exercise as instructed.    Return in about 4 weeks (around 2024).       Current Outpatient Medications   Medication Sig Dispense Refill    propranolol (INDERAL LA) 60 MG extended release capsule Take 1 capsule by mouth daily       No current facility-administered medications for this visit.       Patient Active Problem List   Diagnosis    Closed displaced fracture of surgical neck of left humerus       Past Medical History:   Diagnosis Date    PONV (postoperative nausea and vomiting)     Prolonged emergence from general anesthesia        Past Surgical History:   Procedure Laterality Date    APPENDECTOMY       SECTION      COLONOSCOPY      HUMERUS FRACTURE SURGERY Left 2024

## 2024-05-29 ENCOUNTER — OFFICE VISIT (OUTPATIENT)
Dept: ORTHOPEDIC SURGERY | Age: 69
End: 2024-05-29
Payer: MEDICARE

## 2024-05-29 VITALS — HEIGHT: 66 IN | BODY MASS INDEX: 28.93 KG/M2 | TEMPERATURE: 98 F | WEIGHT: 180 LBS

## 2024-05-29 DIAGNOSIS — S42.212A FX HUMERAL NECK, LEFT, CLOSED, INITIAL ENCOUNTER: Primary | ICD-10-CM

## 2024-05-29 PROCEDURE — 1036F TOBACCO NON-USER: CPT | Performed by: ORTHOPAEDIC SURGERY

## 2024-05-29 PROCEDURE — G8427 DOCREV CUR MEDS BY ELIG CLIN: HCPCS | Performed by: ORTHOPAEDIC SURGERY

## 2024-05-29 PROCEDURE — 1090F PRES/ABSN URINE INCON ASSESS: CPT | Performed by: ORTHOPAEDIC SURGERY

## 2024-05-29 PROCEDURE — G8400 PT W/DXA NO RESULTS DOC: HCPCS | Performed by: ORTHOPAEDIC SURGERY

## 2024-05-29 PROCEDURE — 1123F ACP DISCUSS/DSCN MKR DOCD: CPT | Performed by: ORTHOPAEDIC SURGERY

## 2024-05-29 PROCEDURE — G8419 CALC BMI OUT NRM PARAM NOF/U: HCPCS | Performed by: ORTHOPAEDIC SURGERY

## 2024-05-29 PROCEDURE — 99213 OFFICE O/P EST LOW 20 MIN: CPT | Performed by: ORTHOPAEDIC SURGERY

## 2024-05-29 PROCEDURE — 3017F COLORECTAL CA SCREEN DOC REV: CPT | Performed by: ORTHOPAEDIC SURGERY

## 2024-05-29 NOTE — PROGRESS NOTES
Chief Complaint:   Chief Complaint   Patient presents with    Shoulder Pain     Left shoulder ORIF f/u DOS 2/6/24. States the shoulder is locked and is not progressing at PT.        Cathi Kirk follows almost 4 months postop ORIF left proximal humerus.  Her main problem now is that she is stiff.  She cannot raise her arm up actively beyond about 85 to 90 degrees in flexion.  Other ranges are limited as well.  Physical therapy is continue with her and makes again gradually over time.  They apparently want to try dry needling on her.      Allergies; medications; past medical, surgical, family, and social history; and problem list have been reviewed today and updated as indicated in this encounter seen below.    Exam: Range of motion of the left shoulder is to about 130 degrees of elevation passively.  Active range is about 85 degrees of flexion.  Her abduction is limited as a rotation.  She is able to reach the left SI joint and internal rotation.  Strength is fair.  There is no gross instability.    Radiographs: Previous imaging showed good progressive healing of the humerus with intact fixation.    ASSESSMENT:    Cathi was seen today for shoulder pain.    Diagnoses and all orders for this visit:    Fx humeral neck, left, closed, initial encounter        PLAN: Continue with physical therapy.  There are no contraindications to dry needling.  Any other modalities that might help her regain range of motion without excessive force are acceptable.    Return in about 3 weeks (around 6/19/2024).       Current Outpatient Medications   Medication Sig Dispense Refill    propranolol (INDERAL LA) 60 MG extended release capsule Take 1 capsule by mouth daily       No current facility-administered medications for this visit.       Patient Active Problem List   Diagnosis    Closed displaced fracture of surgical neck of left humerus       Past Medical History:   Diagnosis Date    PONV (postoperative nausea and vomiting)

## 2024-06-17 ENCOUNTER — OFFICE VISIT (OUTPATIENT)
Age: 69
End: 2024-06-17
Payer: MEDICARE

## 2024-06-17 VITALS
DIASTOLIC BLOOD PRESSURE: 81 MMHG | BODY MASS INDEX: 30.36 KG/M2 | WEIGHT: 188 LBS | SYSTOLIC BLOOD PRESSURE: 155 MMHG | HEART RATE: 75 BPM

## 2024-06-17 DIAGNOSIS — R30.0 DYSURIA: ICD-10-CM

## 2024-06-17 DIAGNOSIS — Z12.11 SCREEN FOR COLON CANCER: ICD-10-CM

## 2024-06-17 DIAGNOSIS — Z12.31 BREAST CANCER SCREENING BY MAMMOGRAM: Primary | ICD-10-CM

## 2024-06-17 LAB
BILIRUBIN, POC: NORMAL
BLOOD URINE, POC: NORMAL
CLARITY, POC: NORMAL
COLOR, POC: NORMAL
GLUCOSE URINE, POC: NEGATIVE
KETONES, POC: NORMAL
LEUKOCYTE EST, POC: NORMAL
NITRITE, POC: NEGATIVE
PH, POC: NORMAL
PROTEIN, POC: NEGATIVE
SPECIFIC GRAVITY, POC: NORMAL
UROBILINOGEN, POC: NORMAL

## 2024-06-17 PROCEDURE — G0101 CA SCREEN;PELVIC/BREAST EXAM: HCPCS | Performed by: LEGAL MEDICINE

## 2024-06-17 PROCEDURE — G8400 PT W/DXA NO RESULTS DOC: HCPCS | Performed by: LEGAL MEDICINE

## 2024-06-17 PROCEDURE — 99213 OFFICE O/P EST LOW 20 MIN: CPT | Performed by: LEGAL MEDICINE

## 2024-06-17 PROCEDURE — 1036F TOBACCO NON-USER: CPT | Performed by: LEGAL MEDICINE

## 2024-06-17 PROCEDURE — 1090F PRES/ABSN URINE INCON ASSESS: CPT | Performed by: LEGAL MEDICINE

## 2024-06-17 PROCEDURE — G8427 DOCREV CUR MEDS BY ELIG CLIN: HCPCS | Performed by: LEGAL MEDICINE

## 2024-06-17 PROCEDURE — 3017F COLORECTAL CA SCREEN DOC REV: CPT | Performed by: LEGAL MEDICINE

## 2024-06-17 PROCEDURE — G8417 CALC BMI ABV UP PARAM F/U: HCPCS | Performed by: LEGAL MEDICINE

## 2024-06-17 PROCEDURE — 81002 URINALYSIS NONAUTO W/O SCOPE: CPT | Performed by: LEGAL MEDICINE

## 2024-06-17 RX ORDER — CEFDINIR 300 MG/1
300 CAPSULE ORAL 2 TIMES DAILY
Qty: 14 CAPSULE | Refills: 0 | Status: SHIPPED | OUTPATIENT
Start: 2024-06-17 | End: 2024-06-24

## 2024-06-17 RX ORDER — PHENAZOPYRIDINE HYDROCHLORIDE 200 MG/1
200 TABLET, FILM COATED ORAL 2 TIMES DAILY PRN
Qty: 14 TABLET | Refills: 0 | Status: SHIPPED | OUTPATIENT
Start: 2024-06-17 | End: 2024-06-24

## 2024-06-17 SDOH — ECONOMIC STABILITY: HOUSING INSECURITY
IN THE LAST 12 MONTHS, WAS THERE A TIME WHEN YOU DID NOT HAVE A STEADY PLACE TO SLEEP OR SLEPT IN A SHELTER (INCLUDING NOW)?: NO

## 2024-06-17 SDOH — ECONOMIC STABILITY: FOOD INSECURITY: WITHIN THE PAST 12 MONTHS, THE FOOD YOU BOUGHT JUST DIDN'T LAST AND YOU DIDN'T HAVE MONEY TO GET MORE.: NEVER TRUE

## 2024-06-17 SDOH — ECONOMIC STABILITY: INCOME INSECURITY: HOW HARD IS IT FOR YOU TO PAY FOR THE VERY BASICS LIKE FOOD, HOUSING, MEDICAL CARE, AND HEATING?: NOT HARD AT ALL

## 2024-06-17 SDOH — ECONOMIC STABILITY: FOOD INSECURITY: WITHIN THE PAST 12 MONTHS, YOU WORRIED THAT YOUR FOOD WOULD RUN OUT BEFORE YOU GOT MONEY TO BUY MORE.: NEVER TRUE

## 2024-06-17 ASSESSMENT — ENCOUNTER SYMPTOMS
ABDOMINAL PAIN: 0
NAUSEA: 0
RECTAL PAIN: 0
VOMITING: 0
CONSTIPATION: 0
ABDOMINAL DISTENTION: 0
DIARRHEA: 0
BLOOD IN STOOL: 0

## 2024-06-17 ASSESSMENT — PATIENT HEALTH QUESTIONNAIRE - PHQ9
SUM OF ALL RESPONSES TO PHQ QUESTIONS 1-9: 0
1. LITTLE INTEREST OR PLEASURE IN DOING THINGS: NOT AT ALL
2. FEELING DOWN, DEPRESSED OR HOPELESS: NOT AT ALL
SUM OF ALL RESPONSES TO PHQ9 QUESTIONS 1 & 2: 0

## 2024-06-17 NOTE — PATIENT INSTRUCTIONS
Call me with bloating ,cramping , abdominal pain lasting 2 weeks or more.  Call me with any changes in your bowel or urinary habits lasting 2 weeks or more.  Call me with any spotting or bleeding.  These are signs of ovarian and uterine cancer.    A referral has been made for you to .  If his office has not contacted you within the next 2 weeks, please call this office so we can expedite the appointment.

## 2024-06-17 NOTE — PROGRESS NOTES
This report has been created using voice recognition software.  It may contain errors which are inherent in voice recognition technology.    Cathi Kirk     Patient presents for annual exam.  She also complains of dysuria and urgency for 7 days.  No hematuria.  No fevers or chills.  No flank pain.  No nausea or vomiting.  No problems with her bowel movements.  Has a history of colonoscopy, was found to have polyp.  Was then lost to follow-up.  Would like an appointment for screening colonoscopy.    Past Medical History:   Diagnosis Date    PONV (postoperative nausea and vomiting)     Prolonged emergence from general anesthesia         Past Surgical History:   Procedure Laterality Date    APPENDECTOMY       SECTION      COLONOSCOPY      HUMERUS FRACTURE SURGERY Left 2024    HUMERUS OPEN REDUCTION INTERNAL FIXATION performed by WILFREDO Chavira DO at [facility] OR    OVARIAN CYST REMOVAL      WISDOM TOOTH EXTRACTION      also removed impacted teeth        Family History   Problem Relation Age of Onset    Pancreatic Cancer Mother           Current Outpatient Medications:     cefdinir (OMNICEF) 300 MG capsule, Take 1 capsule by mouth 2 times daily for 7 days, Disp: 14 capsule, Rfl: 0    phenazopyridine (PYRIDIUM) 200 MG tablet, Take 1 tablet by mouth 2 times daily as needed for Pain Check home pregnancy test before use, Disp: 14 tablet, Rfl: 0    propranolol (INDERAL LA) 60 MG extended release capsule, Take 1 capsule by mouth daily, Disp: , Rfl:      No Known Allergies     Social History       Tobacco History       Smoking Status  Never      Smokeless Tobacco Use  Never              Alcohol History       Alcohol Use Status  Yes Comment  occ a glass on wine              Drug Use       Drug Use Status  Never              Sexual Activity       Sexually Active  Not Asked                     Review of Systems   Constitutional:  Negative for appetite change, chills and fever.   Gastrointestinal:  Negative for

## 2024-06-18 ENCOUNTER — TELEPHONE (OUTPATIENT)
Age: 69
End: 2024-06-18

## 2024-06-18 RX ORDER — CIPROFLOXACIN 500 MG/1
500 TABLET, FILM COATED ORAL 2 TIMES DAILY
Qty: 14 TABLET | Refills: 0 | Status: SHIPPED | OUTPATIENT
Start: 2024-06-18 | End: 2024-06-25

## 2024-06-18 NOTE — TELEPHONE ENCOUNTER
Please call patient 6/18/2024.  Studies shows she has a bladder infection.  The bacteria is not sensitive to Omnicef.  She needs to stop that medication.  Order for new antibiotic was sent to her pharmacy.

## 2024-06-19 ENCOUNTER — OFFICE VISIT (OUTPATIENT)
Dept: ORTHOPEDIC SURGERY | Age: 69
End: 2024-06-19
Payer: MEDICARE

## 2024-06-19 VITALS — HEIGHT: 66 IN | TEMPERATURE: 98 F | BODY MASS INDEX: 30.22 KG/M2 | WEIGHT: 188 LBS

## 2024-06-19 DIAGNOSIS — S42.212A FX HUMERAL NECK, LEFT, CLOSED, INITIAL ENCOUNTER: Primary | ICD-10-CM

## 2024-06-19 PROCEDURE — 1090F PRES/ABSN URINE INCON ASSESS: CPT | Performed by: ORTHOPAEDIC SURGERY

## 2024-06-19 PROCEDURE — G8417 CALC BMI ABV UP PARAM F/U: HCPCS | Performed by: ORTHOPAEDIC SURGERY

## 2024-06-19 PROCEDURE — 1036F TOBACCO NON-USER: CPT | Performed by: ORTHOPAEDIC SURGERY

## 2024-06-19 PROCEDURE — 1123F ACP DISCUSS/DSCN MKR DOCD: CPT | Performed by: ORTHOPAEDIC SURGERY

## 2024-06-19 PROCEDURE — 3017F COLORECTAL CA SCREEN DOC REV: CPT | Performed by: ORTHOPAEDIC SURGERY

## 2024-06-19 PROCEDURE — 99213 OFFICE O/P EST LOW 20 MIN: CPT | Performed by: ORTHOPAEDIC SURGERY

## 2024-06-19 PROCEDURE — G8400 PT W/DXA NO RESULTS DOC: HCPCS | Performed by: ORTHOPAEDIC SURGERY

## 2024-06-19 PROCEDURE — G8427 DOCREV CUR MEDS BY ELIG CLIN: HCPCS | Performed by: ORTHOPAEDIC SURGERY

## 2024-06-19 NOTE — PROGRESS NOTES
Chief Complaint:   Chief Complaint   Patient presents with    Shoulder Pain     Left shoulder ORIF DOS 2/6/24. Patient done with PT on Friday. Shoulder sore today because of just coming from PT. Overall shoulder improving since last visit.        Cathi Kirk follows a little over 4 months postop ORIF left proximal humerus.  She is doing pretty well.  Her movement has improved somewhat.  She still has limitations of elevation of the arm.  Activities of daily living are somewhat easier.  Therapy is involved same exercises on a serial basis and she is able to do these at home and will persist after she is done in the next 1 or 2 sessions.      Allergies; medications; past medical, surgical, family, and social history; and problem list have been reviewed today and updated as indicated in this encounter seen below.    Exam: Range of motion of the left shoulder actively is about 90 degrees of abduction tear.  There is slightly more forward flexion.  Rotation is to the lateral occiput externally and to the SI joint internally.  Strength is fair    Radiographs: Previous imaging was reviewed showing good position alignment of the plate and screws and healing of the fracture.    ASSESSMENT:    Cathi was seen today for shoulder pain.    Diagnoses and all orders for this visit:    Fx humeral neck, left, closed, initial encounter        PLAN: Continue with home exercises on a regular basis.    Return if symptoms worsen or fail to improve.       Current Outpatient Medications   Medication Sig Dispense Refill    ciprofloxacin (CIPRO) 500 MG tablet Take 1 tablet by mouth 2 times daily for 7 days 14 tablet 0    cefdinir (OMNICEF) 300 MG capsule Take 1 capsule by mouth 2 times daily for 7 days 14 capsule 0    phenazopyridine (PYRIDIUM) 200 MG tablet Take 1 tablet by mouth 2 times daily as needed for Pain Check home pregnancy test before use 14 tablet 0    propranolol (INDERAL LA) 60 MG extended release capsule Take 1 capsule by

## 2024-06-26 ENCOUNTER — HOSPITAL ENCOUNTER (OUTPATIENT)
Dept: MAMMOGRAPHY | Age: 69
Discharge: HOME | End: 2024-06-28
Attending: LEGAL MEDICINE
Payer: MEDICARE

## 2024-06-26 VITALS — BODY MASS INDEX: 30.82 KG/M2 | HEIGHT: 65 IN | WEIGHT: 185 LBS

## 2024-06-26 DIAGNOSIS — Z12.31 BREAST CANCER SCREENING BY MAMMOGRAM: ICD-10-CM

## 2024-06-26 PROCEDURE — 77063 BREAST TOMOSYNTHESIS BI: CPT

## 2024-07-10 DIAGNOSIS — R30.0 DYSURIA: ICD-10-CM

## (undated) DEVICE — Device

## (undated) DEVICE — SOLUTION IRRIG 1000ML 0.9% SOD CHL USP POUR PLAS BTL

## (undated) DEVICE — 4-PORT MANIFOLD: Brand: NEPTUNE 2

## (undated) DEVICE — TUBING, SUCTION, 1/4" X 10', STRAIGHT: Brand: MEDLINE

## (undated) DEVICE — SYRINGE IRRIG 60ML SFT PLIABLE BLB EZ TO GRP 1 HND USE W/

## (undated) DEVICE — GLOVE ORTHO 8   MSG9480

## (undated) DEVICE — 3M™ IOBAN™ 2 ANTIMICROBIAL INCISE DRAPE 6650EZ: Brand: IOBAN™ 2

## (undated) DEVICE — BANDAGE,SELF ADHRNT,COFLEX,4"X5YD,STRL: Brand: COLABEL

## (undated) DEVICE — PACK,EXTREMITY,ORTHOMAX,5/CS: Brand: MEDLINE

## (undated) DEVICE — BLADE,STAINLESS-STEEL,10,STRL,DISPOSABLE: Brand: MEDLINE

## (undated) DEVICE — 1010 S-DRAPE TOWEL DRAPE 10/BX: Brand: STERI-DRAPE™

## (undated) DEVICE — DRAPE C ARM W41XL65IN UNIV W/ CLP AND RUBBERBAND

## (undated) DEVICE — MARKER,SKIN,WI/RULER AND LABELS: Brand: MEDLINE

## (undated) DEVICE — MEDI-VAC YANKAUER SUCTION HANDLE: Brand: CARDINAL HEALTH

## (undated) DEVICE — SWANSON SUTURE PASSER

## (undated) DEVICE — PREMIUM WET SKIN PREP TRAY: Brand: MEDLINE INDUSTRIES, INC.

## (undated) DEVICE — GOWN,SIRUS,FABRNF,XL,20/CS: Brand: MEDLINE

## (undated) DEVICE — 450 ML BOTTLE OF 0.05% CHLORHEXIDINE GLUCONATE IN 99.95% STERILE WATER FOR IRRIGATION, USP AND APPLICATOR.: Brand: IRRISEPT ANTIMICROBIAL WOUND LAVAGE

## (undated) DEVICE — BASIC DOUBLE BASIN 2-LF: Brand: MEDLINE INDUSTRIES, INC.

## (undated) DEVICE — BANDAGE,GAUZE,4.5"X4.1YD,STERILE,LF: Brand: MEDLINE

## (undated) DEVICE — GOWN,SIRUS,POLYRNF,BRTHSLV,XLN/XL,20/CS: Brand: MEDLINE

## (undated) DEVICE — BANDAGE,ELASTIC,ESMARK,STERILE,4"X9',LF: Brand: MEDLINE

## (undated) DEVICE — WIPES SKIN CLOTH READYPREP 9 X 10.5 IN 2% CHLORHEX GLUCONATE CHG PREOP

## (undated) DEVICE — GARMENT,MEDLINE,DVT,INT,CALF,MED, GEN2: Brand: MEDLINE

## (undated) DEVICE — ELECTRODE PT RET AD L9FT HI MOIST COND ADH HYDRGEL CORDED

## (undated) DEVICE — SYRINGE MED 10ML TRNSLUC BRL PLUNG BLK MRK POLYPR CTRL

## (undated) DEVICE — BANDAGE COMPR W4INXL5YD WHT BGE POLY COT M E WRP WV HK AND

## (undated) DEVICE — SOLUTION IRRIG 250ML 0.9% SOD CHL USP POUR PLAS BTL

## (undated) DEVICE — TOWEL,OR,DSP,ST,BLUE,STD,6/PK,12PK/CS: Brand: MEDLINE

## (undated) DEVICE — NEEDLE HYPO 25GA L1.5IN BLU POLYPR HUB S STL REG BVL STR

## (undated) DEVICE — GAUZE,SPONGE,4"X4",16PLY,STRL,LF,10/TRAY: Brand: MEDLINE

## (undated) DEVICE — SHEET,DRAPE,40X58,STERILE: Brand: MEDLINE

## (undated) DEVICE — COVER,LIGHT HANDLE,FLX,1/PK: Brand: MEDLINE INDUSTRIES, INC.

## (undated) DEVICE — 3M™ STERI-DRAPE™ U-DRAPE 1015: Brand: STERI-DRAPE™

## (undated) DEVICE — BIT DRL 2.5 MM ORTHOLOC

## (undated) DEVICE — DRESSING HYDROFIBER AQUACEL AG ADVANTAGE 3.5X10 IN

## (undated) DEVICE — DRESSING GZ XRFRM 4X4(25/BX 6BX/CS)

## (undated) DEVICE — GLOVE ORANGE PI 8   MSG9080

## (undated) DEVICE — PAD,ABDOMINAL,5"X9",ST,LF,25/BX: Brand: MEDLINE INDUSTRIES, INC.

## (undated) DEVICE — NDL CNTR 40CT FM MAG: Brand: MEDLINE INDUSTRIES, INC.